# Patient Record
Sex: FEMALE | Race: BLACK OR AFRICAN AMERICAN | NOT HISPANIC OR LATINO | Employment: FULL TIME | URBAN - METROPOLITAN AREA
[De-identification: names, ages, dates, MRNs, and addresses within clinical notes are randomized per-mention and may not be internally consistent; named-entity substitution may affect disease eponyms.]

---

## 2021-11-01 ENCOUNTER — HOSPITAL ENCOUNTER (EMERGENCY)
Facility: HOSPITAL | Age: 41
Discharge: HOME | End: 2021-11-01
Attending: INTERNAL MEDICINE
Payer: MEDICAID

## 2021-11-01 VITALS
HEIGHT: 62 IN | SYSTOLIC BLOOD PRESSURE: 133 MMHG | WEIGHT: 132 LBS | OXYGEN SATURATION: 100 % | HEART RATE: 91 BPM | BODY MASS INDEX: 24.29 KG/M2 | DIASTOLIC BLOOD PRESSURE: 83 MMHG | RESPIRATION RATE: 18 BRPM

## 2021-11-01 DIAGNOSIS — S60.021A CONTUSION OF RIGHT INDEX FINGER WITHOUT DAMAGE TO NAIL, INITIAL ENCOUNTER: Primary | ICD-10-CM

## 2021-11-01 LAB
B-HCG UR QL: NEGATIVE
CTP QC/QA: YES

## 2021-11-01 PROCEDURE — 29130 APPL FINGER SPLINT STATIC: CPT | Mod: F6,ER

## 2021-11-01 PROCEDURE — 81025 URINE PREGNANCY TEST: CPT | Mod: ER | Performed by: EMERGENCY MEDICINE

## 2021-11-01 PROCEDURE — 99284 EMERGENCY DEPT VISIT MOD MDM: CPT | Mod: 25,ER

## 2021-11-01 RX ORDER — DEXTROMETHORPHAN HYDROBROMIDE, GUAIFENESIN 5; 100 MG/5ML; MG/5ML
650 LIQUID ORAL EVERY 8 HOURS
Qty: 20 TABLET | Refills: 0 | Status: SHIPPED | OUTPATIENT
Start: 2021-11-01

## 2021-11-01 RX ORDER — IBUPROFEN 600 MG/1
600 TABLET ORAL EVERY 6 HOURS PRN
Qty: 20 TABLET | Refills: 0 | Status: SHIPPED | OUTPATIENT
Start: 2021-11-01

## 2021-12-17 ENCOUNTER — HOSPITAL ENCOUNTER (EMERGENCY)
Facility: HOSPITAL | Age: 41
Discharge: HOME | End: 2021-12-17
Attending: INTERNAL MEDICINE
Payer: MEDICAID

## 2021-12-17 VITALS
HEIGHT: 62 IN | HEART RATE: 80 BPM | DIASTOLIC BLOOD PRESSURE: 68 MMHG | RESPIRATION RATE: 18 BRPM | SYSTOLIC BLOOD PRESSURE: 115 MMHG | WEIGHT: 120 LBS | BODY MASS INDEX: 22.08 KG/M2 | TEMPERATURE: 99 F | OXYGEN SATURATION: 99 %

## 2021-12-17 DIAGNOSIS — T24.101A SUPERFICIAL BURN OF RIGHT LOWER EXTREMITY, INITIAL ENCOUNTER: Primary | ICD-10-CM

## 2021-12-17 LAB
B-HCG UR QL: NEGATIVE
CTP QC/QA: YES

## 2021-12-17 PROCEDURE — 99283 EMERGENCY DEPT VISIT LOW MDM: CPT | Mod: ER

## 2021-12-17 PROCEDURE — 81025 URINE PREGNANCY TEST: CPT | Mod: ER | Performed by: EMERGENCY MEDICINE

## 2021-12-17 RX ORDER — SILVER SULFADIAZINE 10 G/1000G
CREAM TOPICAL 2 TIMES DAILY
Qty: 30 G | Refills: 0 | Status: SHIPPED | OUTPATIENT
Start: 2021-12-17 | End: 2021-12-24

## 2023-04-21 ENCOUNTER — OFFICE VISIT (OUTPATIENT)
Dept: URGENT CARE | Facility: CLINIC | Age: 43
End: 2023-04-21
Payer: COMMERCIAL

## 2023-04-21 VITALS
BODY MASS INDEX: 22.08 KG/M2 | DIASTOLIC BLOOD PRESSURE: 62 MMHG | HEIGHT: 62 IN | HEART RATE: 70 BPM | WEIGHT: 120 LBS | SYSTOLIC BLOOD PRESSURE: 124 MMHG | RESPIRATION RATE: 19 BRPM | TEMPERATURE: 98 F | OXYGEN SATURATION: 98 %

## 2023-04-21 DIAGNOSIS — M25.562 ACUTE PAIN OF BOTH KNEES: ICD-10-CM

## 2023-04-21 DIAGNOSIS — M25.561 ACUTE PAIN OF BOTH KNEES: ICD-10-CM

## 2023-04-21 DIAGNOSIS — S89.92XA KNEE INJURY, LEFT, INITIAL ENCOUNTER: ICD-10-CM

## 2023-04-21 DIAGNOSIS — S89.91XA KNEE INJURY, RIGHT, INITIAL ENCOUNTER: ICD-10-CM

## 2023-04-21 DIAGNOSIS — Z02.6 ENCOUNTER RELATED TO WORKER'S COMPENSATION CLAIM: Primary | ICD-10-CM

## 2023-04-21 PROCEDURE — 73562 X-RAY EXAM OF KNEE 3: CPT | Mod: 50,S$GLB,, | Performed by: RADIOLOGY

## 2023-04-21 PROCEDURE — 73562 XR KNEE 3 VIEW BILATERAL: ICD-10-PCS | Mod: 50,S$GLB,, | Performed by: RADIOLOGY

## 2023-04-21 PROCEDURE — 99203 PR OFFICE/OUTPT VISIT, NEW, LEVL III, 30-44 MIN: ICD-10-PCS | Mod: S$GLB,,, | Performed by: FAMILY MEDICINE

## 2023-04-21 PROCEDURE — 99203 OFFICE O/P NEW LOW 30 MIN: CPT | Mod: S$GLB,,, | Performed by: FAMILY MEDICINE

## 2023-04-21 NOTE — LETTER
Urgent Care - [address]  [facility] [address]  Phone: [phone]  Fax: [phone]  [facility]: [email or web address]    Pt Name: Cee Wright Date: 04/16/2023   Employee ID:  Date of First Treatment: 04/21/2023   Company: [employer]      Appointment Time: 10:40 AM Arrived: 10:58 AM   Provider: Natali Nunez MD Time Out:1: 23 pm     Office Treatment:   1. Encounter related to worker's compensation claim    2. Acute pain of both knees    3. Knee injury, left, initial encounter    4. Knee injury, right, initial encounter         Patient Instructions   You have been seen for a work-related injury/ailment. You are released to go home and you need to follow up with [facility] on the next business day.  Please call [email or web address] for help setting up the appointment.     Take tylenol/ ibuprofen per directions of the label               Return Appointment: None. Follow up with Occupational health.

## 2023-04-21 NOTE — PATIENT INSTRUCTIONS
You have been seen for a work-related injury/ailment. You are released to go home and you need to follow up with [facility] on the next business day.  Please call [email or web address] for help setting up the appointment.     Take tylenol/ ibuprofen per directions of the label

## 2023-04-21 NOTE — PROGRESS NOTES
"Subjective:      Patient ID: Cee Chambers is a 42 y.o. female.    Vitals:  height is 5' 2" (1.575 m) and weight is 54.4 kg (120 lb). Her temperature is 98 °F (36.7 °C). Her blood pressure is 124/62 and her pulse is 70. Her respiration is 19 and oxygen saturation is 98%.     Chief Complaint: Knee Injury (Both )    Knee Injury  This is a new problem. The current episode started in the past 7 days (on monday). The problem occurs constantly. The problem has been gradually worsening. Associated symptoms include joint swelling. Pertinent negatives include no congestion, coughing, diaphoresis, fever, myalgias, nausea, neck pain, rash, visual change, vomiting or weakness. The symptoms are aggravated by bending and walking. She has tried ice and heat for the symptoms. The treatment provided mild relief.     Constitution: Negative for sweating and fever.   HENT:  Negative for congestion.    Neck: Negative for neck pain.   Respiratory:  Negative for cough.    Gastrointestinal:  Negative for nausea and vomiting.   Musculoskeletal:  Positive for joint swelling. Negative for muscle ache.   Skin:  Negative for rash.    Objective:     Vitals:    04/21/23 1101   BP: 124/62   Pulse: 70   Resp: 19   Temp: 98 °F (36.7 °C)   SpO2: 98%   Weight: 54.4 kg (120 lb)   Height: 5' 2" (1.575 m)      Physical Exam   Pulmonary/Chest: Effort normal.   Musculoskeletal: Normal range of motion.         General: Tenderness (right anterior knee> left anterior knee) present. No swelling, deformity or signs of injury. Normal range of motion.   Neurological: She is alert. No sensory deficit. Gait normal.   Psychiatric: Judgment and thought content normal.     XR KNEE 3 VIEW BILATERAL    Result Date: 4/21/2023  EXAMINATION: XR KNEE 3 VIEW BILATERAL CLINICAL HISTORY: Pain in right knee TECHNIQUE: AP, lateral, and Merchant views of both knees were performed. COMPARISON: None FINDINGS: Bones are well mineralized. Overall alignment is within normal limits.  " No displaced fracture, dislocation or destructive osseous process.  No large suprapatellar joint effusion.  Joint spaces appear relatively maintained. No subcutaneous emphysema or radiodense retained foreign body.     No acute displaced fracture-dislocation identified at either knee. Electronically signed by: Nba Murguia MD Date:    04/21/2023 Time:    13:22    Assessment:     1. Encounter related to worker's compensation claim    2. Acute pain of both knees    3. Knee injury, left, initial encounter    4. Knee injury, right, initial encounter        Plan:     Patient checked in at 10:58 am. I came in to see patient at 11:35 am but was told that patient had to leave to attend a previous engagement. X-ray was ordered prior but the machine is currently being serviced hence prolonged visit time.    Encounter related to worker's compensation claim  -     Ambulatory referral/consult to Occupational Medicine    2. Acute pain of both knees  -     XR KNEE 3 VIEW BILATERAL; Future; Expected date: 04/21/2023    3. Knee injury, left, initial encounter  4. Knee injury, right, initial encounter  RICE. OTC analgesia.     Patient Instructions   You have been seen for a work-related injury/ailment. You are released to go home and you need to follow up with [facility] on the next business day.  Please call [email or web address] for help setting up the appointment.     Take tylenol/ ibuprofen per directions of the label

## 2023-04-27 ENCOUNTER — OFFICE VISIT (OUTPATIENT)
Dept: URGENT CARE | Facility: CLINIC | Age: 43
End: 2023-04-27
Payer: COMMERCIAL

## 2023-04-27 VITALS
DIASTOLIC BLOOD PRESSURE: 69 MMHG | HEART RATE: 79 BPM | BODY MASS INDEX: 22.08 KG/M2 | OXYGEN SATURATION: 100 % | HEIGHT: 62 IN | WEIGHT: 120 LBS | RESPIRATION RATE: 16 BRPM | SYSTOLIC BLOOD PRESSURE: 108 MMHG | TEMPERATURE: 98 F

## 2023-04-27 DIAGNOSIS — S46.812A STRAIN OF LEFT TRAPEZIUS MUSCLE, INITIAL ENCOUNTER: ICD-10-CM

## 2023-04-27 DIAGNOSIS — Z02.6 ENCOUNTER RELATED TO WORKER'S COMPENSATION CLAIM: Primary | ICD-10-CM

## 2023-04-27 DIAGNOSIS — S39.012A STRAIN OF SACRUM, INITIAL ENCOUNTER: ICD-10-CM

## 2023-04-27 DIAGNOSIS — M25.561 ACUTE PAIN OF RIGHT KNEE: ICD-10-CM

## 2023-04-27 DIAGNOSIS — S80.01XA CONTUSION OF RIGHT KNEE, INITIAL ENCOUNTER: ICD-10-CM

## 2023-04-27 PROCEDURE — 99215 OFFICE O/P EST HI 40 MIN: CPT | Mod: S$GLB,,, | Performed by: STUDENT IN AN ORGANIZED HEALTH CARE EDUCATION/TRAINING PROGRAM

## 2023-04-27 PROCEDURE — 99215 PR OFFICE/OUTPT VISIT, EST, LEVL V, 40-54 MIN: ICD-10-PCS | Mod: S$GLB,,, | Performed by: STUDENT IN AN ORGANIZED HEALTH CARE EDUCATION/TRAINING PROGRAM

## 2023-04-27 NOTE — PROGRESS NOTES
Subjective:      Patient ID: Cee Chambers is a 42 y.o. female.    Chief Complaint: Knee Injury (DOI: 04/17/2023 Rt Knee/Lm)    Patient's place of employment - [employer]  Patient's job title - Hospitality   Date of injury - 04/17/2023  Body part injured including left or right - Rt Knee  Injury Mechanism -Box  What they were doing when they got hurt - Pt was picking up items on the floor when the box fell and hit her in the RT knee.   What they did immediately after - Pt did report it and continued working  Pain scale right now - 6  Lm    See MA note above. 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 She was on assignement at [employer] and was helping with stocking. While pushing a cart of boxes some of the ketchup bottles fell out. As she was bending to her left to pick them up, the box fell out of the cart and landed on her right knee. She fell on her left side landing on her left elbow, knee, and hip. She reported it to her supervisor that day but felt well enough to continue working. She tried ice and heat Monday after initial injury which helped and she felt ok when she returned to work the following day (Tuesday). She didn't continue the ice/heat due to minimal symptoms. She started feeling back pain 2 days later on Wednesday and it was hard to walk.  She went to urgent care for evaluation on Friday and had xrays which were negative but no further eval at that time since she had to leave. She was referred by her company to see Dr. Urbano with [facility] and had an appointment with him on Tuesday, April 25th. Patient preferred not to take any medication for her symptoms. No imaging at that time. She was provided with work limitations and told to follow up with Occupational Health.     Today, the patient reports her right knee pain and right lower back pain are her primary concerns. She has difficulty walking for long periods and certain movements, (  bending, squatting, lifting) increase the pain in her knee and back. She also notes pain that is intermittent in the left upper back. Was present after the fall but waxes and wanes. She denies any  numbness, tingling, leg weakness, radiation of pain, bowel/bladder dysfunction, or other symptoms at this time.       Knee Injury  This is a new problem. The current episode started 1 to 4 weeks ago. The problem occurs constantly. The problem has been gradually worsening. Associated symptoms include arthralgias. Pertinent negatives include no abdominal pain, anorexia, change in bowel habit, chest pain, chills, congestion, coughing, diaphoresis, fatigue, fever, headaches, joint swelling, myalgias, nausea, neck pain, numbness, rash, sore throat, swollen glands, urinary symptoms, vertigo, visual change, vomiting or weakness. The symptoms are aggravated by walking. She has tried heat and ice for the symptoms. The treatment provided mild relief.     Constitution: Negative for chills, sweating, fatigue and fever.   HENT:  Negative for congestion and sore throat.    Neck: Negative for neck pain.   Cardiovascular:  Negative for chest pain.   Respiratory:  Negative for cough.    Gastrointestinal:  Negative for abdominal pain, nausea and vomiting.   Musculoskeletal:  Positive for pain, trauma and joint pain. Negative for joint swelling and muscle ache.   Skin:  Negative for rash.   Neurological:  Negative for history of vertigo, headaches and numbness.   Objective:     Physical Exam  Vitals and nursing note reviewed.   Constitutional:       General: She is not in acute distress.     Appearance: She is not ill-appearing.   HENT:      Head: Normocephalic.   Eyes:      Conjunctiva/sclera: Conjunctivae normal.   Pulmonary:      Effort: No respiratory distress.   Musculoskeletal:      Cervical back: No bony tenderness. Pain with movement present.      Thoracic back: No tenderness or bony tenderness. Normal range of motion.      Lumbar  back: No tenderness or bony tenderness. Negative right straight leg raise test and negative left straight leg raise test.        Back:       Right knee: Crepitus present. Tenderness (proximal knee over superior pattelar region) present. No medial joint line, lateral joint line, MCL, LCL, ACL, PCL or patellar tendon tenderness. No LCL laxity, MCL laxity, ACL laxity or PCL laxity. Normal alignment, normal meniscus and normal patellar mobility. Normal pulse.      Comments: Pinpoint area of tenderness left lower trapezius muscle region. Increased with flexion and extension of neck. No discomfort with UE abduction, adduction, flexion or extension. No spinal tenderness. Tenderness of upper right sacral muscles. Full spinal ROM. Pain elicited to right sacral region with lateral bending to the left only. 5/5 strength BLE.    Skin:     General: Skin is warm and dry.   Neurological:      Mental Status: She is alert and oriented to person, place, and time.   Psychiatric:         Attention and Perception: Attention normal.         Mood and Affect: Mood normal.         Behavior: Behavior normal.      Assessment:      1. Encounter related to worker's compensation claim    2. Strain of sacrum, initial encounter    3. Contusion of right knee, initial encounter    4. Acute pain of right knee    5. Strain of left trapezius muscle, initial encounter      EXAMINATION:  XR KNEE 3 VIEW BILATERAL     CLINICAL HISTORY:  Pain in right knee     TECHNIQUE:  AP, lateral, and Merchant views of both knees were performed.     COMPARISON:  None     FINDINGS:  Bones are well mineralized. Overall alignment is within normal limits.  No displaced fracture, dislocation or destructive osseous process.  No large suprapatellar joint effusion.  Joint spaces appear relatively maintained. No subcutaneous emphysema or radiodense retained foreign body.     Impression:     No acute displaced fracture-dislocation identified at either knee.        Electronically  signed by: Nba Murguia MD  Date:                                            04/21/2023  Time:                                           13:22    Plan:     Diagnosis and plan discussed with the patient as well as the expected course and duration of her  symptoms. Reassurance provided regarding mild nature of the injuries and no alarm features at this time. Supportive care discussed. Recommended home stretches for her upper and lower back and these were reviewed during her visit. We discussed possible need for PT if no improvement in her symptoms. Expected return to full duty within the next 2-4 weeks based on today's evaluation.     All questions and concerns were addressed prior to discharge. Patient verbalized understanding and was agreeable with the plan of care.        Patient Instructions: Attention not to aggravate affected area, Daily home exercises/warm soaks, Apply ice 24-48 hours then apply heat/warm soaks   Restrictions: Avoid frequent bending/lifting/twisting, Avoid climbing/kneeling/squatting, No lifting/pushing/pulling more than 10 lbs, No Prolonged standing/walking  Follow up in about 1 week (around 5/4/2023).    I spent a total of 40 minutes on the day of the visit.This includes face to face time and non-face to face time preparing to see the patient (eg, review of tests), obtaining and/or reviewing separately obtained history, documenting clinical information in the electronic or other health record, independently interpreting results and communicating results to the patient/family/caregiver, or care coordinator.

## 2023-04-27 NOTE — LETTER
[facility] - Occupational Health  [address] [address]  Phone: [phone]  Fax: [phone]  [facility]: [email or web address]     Name: Cee Wright Date: 04/17/2023   Employee ID: [id number] Date of First Treatment: 04/27/2023   Company: [employer]      Appointment Time:  Arrived: 10:30   Provider: Josefina Frank MD Time Out: 11:39 AM      Office Treatment:   1. Encounter related to worker's compensation claim    2. Strain of sacrum, initial encounter    3. Contusion of right knee, initial encounter    4. Acute pain of right knee    5. Strain of left trapezius muscle, initial encounter          Patient Instructions: Attention not to aggravate affected area, Daily home exercises/warm soaks, Apply ice 24-48 hours then apply heat/warm soaks      Restrictions: Avoid frequent bending/lifting/twisting, Avoid climbing/kneeling/squatting, No lifting/pushing/pulling more than 10 lbs, No Prolonged standing/walking     Return Appointment: 5/4/2023 at 10:15 AM [facility]